# Patient Record
Sex: FEMALE | Race: BLACK OR AFRICAN AMERICAN | Employment: FULL TIME | URBAN - METROPOLITAN AREA
[De-identification: names, ages, dates, MRNs, and addresses within clinical notes are randomized per-mention and may not be internally consistent; named-entity substitution may affect disease eponyms.]

---

## 2019-12-10 ENCOUNTER — HOSPITAL ENCOUNTER (EMERGENCY)
Age: 19
Discharge: HOME | End: 2019-12-10

## 2019-12-10 VITALS
HEART RATE: 94 BPM | TEMPERATURE: 99.6 F | DIASTOLIC BLOOD PRESSURE: 72 MMHG | OXYGEN SATURATION: 100 % | BODY MASS INDEX: 39.39 KG/M2 | WEIGHT: 259.92 LBS | HEIGHT: 68 IN | SYSTOLIC BLOOD PRESSURE: 126 MMHG | RESPIRATION RATE: 18 BRPM

## 2019-12-10 DIAGNOSIS — L05.01 PILONIDAL ABSCESS: Primary | ICD-10-CM

## 2019-12-10 PROCEDURE — 6370000000 HC RX 637 (ALT 250 FOR IP): Performed by: PHYSICIAN ASSISTANT

## 2019-12-10 PROCEDURE — 99283 EMERGENCY DEPT VISIT LOW MDM: CPT

## 2019-12-10 PROCEDURE — 4500000023 HC ED LEVEL 3 PROCEDURE

## 2019-12-10 RX ORDER — CEPHALEXIN 250 MG/5ML
500 POWDER, FOR SUSPENSION ORAL 4 TIMES DAILY
Qty: 400 ML | Refills: 0 | Status: SHIPPED | OUTPATIENT
Start: 2019-12-10 | End: 2019-12-20

## 2019-12-10 RX ORDER — SULFAMETHOXAZOLE AND TRIMETHOPRIM 200; 40 MG/5ML; MG/5ML
160 SUSPENSION ORAL 2 TIMES DAILY
Qty: 400 ML | Refills: 0 | Status: SHIPPED | OUTPATIENT
Start: 2019-12-10 | End: 2019-12-20

## 2019-12-10 RX ORDER — IBUPROFEN 800 MG/1
800 TABLET ORAL EVERY 8 HOURS PRN
Qty: 20 TABLET | Refills: 0 | Status: SHIPPED | OUTPATIENT
Start: 2019-12-10

## 2019-12-10 RX ADMIN — IBUPROFEN 800 MG: 200 SUSPENSION ORAL at 18:46

## 2019-12-10 ASSESSMENT — PAIN SCALES - GENERAL
PAINLEVEL_OUTOF10: 0
PAINLEVEL_OUTOF10: 10
PAINLEVEL_OUTOF10: 0

## 2019-12-10 ASSESSMENT — ENCOUNTER SYMPTOMS
VOMITING: 0
ROS SKIN COMMENTS: +ABSCESS
NAUSEA: 0

## 2019-12-10 ASSESSMENT — PAIN DESCRIPTION - DESCRIPTORS: DESCRIPTORS: THROBBING;ACHING

## 2019-12-10 ASSESSMENT — PAIN DESCRIPTION - ORIENTATION: ORIENTATION: MID

## 2019-12-10 ASSESSMENT — PAIN DESCRIPTION - PAIN TYPE: TYPE: ACUTE PAIN

## 2019-12-10 ASSESSMENT — PAIN DESCRIPTION - LOCATION: LOCATION: RECTUM

## 2021-11-13 ENCOUNTER — HOSPITAL ENCOUNTER (EMERGENCY)
Age: 21
Discharge: LEFT BEFORE DISCHARGE | End: 2021-11-13

## 2021-11-13 VITALS
HEIGHT: 68 IN | SYSTOLIC BLOOD PRESSURE: 119 MMHG | TEMPERATURE: 98.2 F | WEIGHT: 204.59 LBS | RESPIRATION RATE: 16 BRPM | BODY MASS INDEX: 31.01 KG/M2 | HEART RATE: 75 BPM | OXYGEN SATURATION: 100 % | DIASTOLIC BLOOD PRESSURE: 79 MMHG

## 2021-11-13 ASSESSMENT — PAIN DESCRIPTION - LOCATION: LOCATION: THROAT

## 2021-11-13 ASSESSMENT — PAIN DESCRIPTION - PAIN TYPE: TYPE: ACUTE PAIN

## 2021-11-13 ASSESSMENT — PAIN SCALES - GENERAL: PAINLEVEL_OUTOF10: 10

## 2022-09-28 ENCOUNTER — APPOINTMENT (OUTPATIENT)
Dept: GENERAL RADIOLOGY | Age: 22
End: 2022-09-28

## 2022-09-28 ENCOUNTER — HOSPITAL ENCOUNTER (EMERGENCY)
Age: 22
Discharge: HOME | End: 2022-09-29
Attending: EMERGENCY MEDICINE

## 2022-09-28 VITALS
HEIGHT: 69 IN | WEIGHT: 256.62 LBS | DIASTOLIC BLOOD PRESSURE: 81 MMHG | SYSTOLIC BLOOD PRESSURE: 118 MMHG | TEMPERATURE: 97.3 F | RESPIRATION RATE: 18 BRPM | OXYGEN SATURATION: 100 % | BODY MASS INDEX: 38.01 KG/M2 | HEART RATE: 75 BPM
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
## 2022-09-28 PROCEDURE — 99283 EMERGENCY DEPT VISIT LOW MDM: CPT

## 2022-09-28 PROCEDURE — 72072 X-RAY EXAM THORAC SPINE 3VWS: CPT

## 2022-09-28 PROCEDURE — 73590 X-RAY EXAM OF LOWER LEG: CPT

## 2022-09-28 RX ORDER — ACETAMINOPHEN 325 MG/1
650 TABLET ORAL ONCE
Status: COMPLETED | OUTPATIENT
Start: 2022-09-28 | End: 2022-09-29

## 2022-09-28 RX ORDER — IBUPROFEN 400 MG/1
400 TABLET ORAL ONCE
Status: COMPLETED | OUTPATIENT
Start: 2022-09-28 | End: 2022-09-29

## 2022-09-28 ASSESSMENT — LIFESTYLE VARIABLES
HOW OFTEN DO YOU HAVE A DRINK CONTAINING ALCOHOL: NEVER
HOW MANY STANDARD DRINKS CONTAINING ALCOHOL DO YOU HAVE ON A TYPICAL DAY: PATIENT DOES NOT DRINK

## 2022-09-29 PROCEDURE — 6370000000 HC RX 637 (ALT 250 FOR IP): Performed by: EMERGENCY MEDICINE

## 2022-09-29 RX ADMIN — ACETAMINOPHEN 650 MG: 325 TABLET ORAL at 00:38

## 2022-09-29 RX ADMIN — IBUPROFEN 400 MG: 400 TABLET, FILM COATED ORAL at 00:37

## 2022-09-29 ASSESSMENT — PAIN DESCRIPTION - LOCATION: LOCATION: BACK;LEG

## 2022-09-29 ASSESSMENT — ENCOUNTER SYMPTOMS
CONSTIPATION: 0
BACK PAIN: 1
SHORTNESS OF BREATH: 0
EYE DISCHARGE: 0
ABDOMINAL PAIN: 0
COUGH: 0
EYE ITCHING: 0
VOMITING: 0
COLOR CHANGE: 0

## 2022-09-29 ASSESSMENT — PAIN - FUNCTIONAL ASSESSMENT: PAIN_FUNCTIONAL_ASSESSMENT: PREVENTS OR INTERFERES WITH MANY ACTIVE NOT PASSIVE ACTIVITIES

## 2022-09-29 ASSESSMENT — PAIN DESCRIPTION - ORIENTATION: ORIENTATION: RIGHT

## 2022-09-29 ASSESSMENT — PAIN SCALES - GENERAL: PAINLEVEL_OUTOF10: 8

## 2022-09-29 ASSESSMENT — PAIN DESCRIPTION - DESCRIPTORS: DESCRIPTORS: ACHING

## 2022-09-29 NOTE — ED NOTES
D/C: Order noted for d/c. Pt confirmed d/c paperwork  have correct name. Discharge and education instructions reviewed with patient. Teach-back successful. Pt verbalized understanding and signed d/c papers. Pt denied questions at this time. No acute distress noted. Patient instructed to follow-up as noted - return to emergency department if symptoms worsen. Patient verbalized understanding. Discharged per EDMD with discharge instructions. Pt discharged  to private vehicle. Patient stable upon departure. Thanked patient for choosing [facility]) for care.                 Semaj Schwab RN  09/29/22 9177
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
## 2023-03-02 ENCOUNTER — HOSPITAL ENCOUNTER (EMERGENCY)
Age: 23
Discharge: HOME | End: 2023-03-02
Attending: EMERGENCY MEDICINE
Payer: MEDICAID

## 2023-03-02 VITALS
RESPIRATION RATE: 16 BRPM | WEIGHT: 261.47 LBS | BODY MASS INDEX: 39.63 KG/M2 | HEART RATE: 70 BPM | HEIGHT: 68 IN | TEMPERATURE: 97.7 F | OXYGEN SATURATION: 100 % | SYSTOLIC BLOOD PRESSURE: 133 MMHG | DIASTOLIC BLOOD PRESSURE: 81 MMHG

## 2023-03-02 DIAGNOSIS — L05.01 PILONIDAL ABSCESS: Primary | ICD-10-CM

## 2023-03-02 PROCEDURE — 2500000003 HC RX 250 WO HCPCS: Performed by: EMERGENCY MEDICINE

## 2023-03-02 PROCEDURE — 10080 I&D PILONIDAL CYST SIMPLE: CPT

## 2023-03-02 PROCEDURE — 99283 EMERGENCY DEPT VISIT LOW MDM: CPT

## 2023-03-02 RX ORDER — NAPROXEN 500 MG/1
500 TABLET ORAL 2 TIMES DAILY WITH MEALS
Qty: 20 TABLET | Refills: 0 | Status: SHIPPED | OUTPATIENT
Start: 2023-03-02 | End: 2023-03-12

## 2023-03-02 RX ORDER — CLINDAMYCIN HYDROCHLORIDE 300 MG/1
300 CAPSULE ORAL EVERY 6 HOURS
Qty: 40 CAPSULE | Refills: 0 | Status: SHIPPED | OUTPATIENT
Start: 2023-03-02 | End: 2023-03-12

## 2023-03-02 RX ADMIN — LIDOCAINE HYDROCHLORIDE 10 ML: 10; .005 INJECTION, SOLUTION EPIDURAL; INFILTRATION; INTRACAUDAL; PERINEURAL at 13:23

## 2023-03-02 ASSESSMENT — PAIN - FUNCTIONAL ASSESSMENT
PAIN_FUNCTIONAL_ASSESSMENT: 0-10
PAIN_FUNCTIONAL_ASSESSMENT: 0-10

## 2023-03-02 ASSESSMENT — PAIN DESCRIPTION - LOCATION: LOCATION: OTHER (COMMENT)

## 2023-03-02 ASSESSMENT — LIFESTYLE VARIABLES: HOW OFTEN DO YOU HAVE A DRINK CONTAINING ALCOHOL: NEVER

## 2023-03-02 ASSESSMENT — PAIN DESCRIPTION - DESCRIPTORS: DESCRIPTORS: SORE

## 2023-03-02 ASSESSMENT — PAIN SCALES - GENERAL
PAINLEVEL_OUTOF10: 7
PAINLEVEL_OUTOF10: 8

## 2023-03-02 NOTE — Clinical Note
Mirtha Urena was seen and treated in our emergency department on 3/2/2023. She may return to work on 03/05/2023. If you have any questions or concerns, please don't hesitate to call.       Sj Mcelroy, DO

## 2023-03-02 NOTE — Clinical Note
Maday Faustin was seen and treated in our emergency department on 3/2/2023. She may return to work on 03/05/2023. If you have any questions or concerns, please don't hesitate to call.       Phyllis Mcelroy, DO

## 2023-03-02 NOTE — DISCHARGE INSTRUCTIONS
Please help your primary care physician within the next 2 days. The packing in your wound should be removed in 48 hours. Do not try to replace the packing if it falls out before then. Complete your entire course of antibiotics as prescribed, even if you start feeling better. May take naproxen as needed for pain, you may safely combine this medication with Tylenol. Return to the emergency room if you have worsening or changing symptoms, fevers, severe pain or increased swelling. Return if you have other new or changing symptoms that concern you and you wish to be reevaluated.
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
## 2023-03-02 NOTE — Clinical Note
Selena Edwards was seen and treated in our emergency department on 3/2/2023. She may return to work on 03/05/2023. If you have any questions or concerns, please don't hesitate to call.       Shahida Mcelroy, DO

## 2023-03-03 NOTE — ED PROVIDER NOTES
EMERGENCY DEPARTMENT PROVIDER NOTE    Patient Identification  Pt Name: Kassie Dueñas  MRN: [id number]  Birthdate 2000  Date of evaluation: 3/2/2023  Provider: Jose Mcelroy DO  PCP: No primary care provider on file.    Chief Complaint  Abscess (Pt states boil above rectum x1wk, pt reports hx of same that was drained in an ED.)      HPI  (History provided by patient)  This is a 22 y.o. female with pertinent past medical history of pilonidal abscesses who was brought in by self for abscess to her right inner buttock.  Symptoms gradually worsening over the past 7 days.  Denies any fevers, chills, rectal pain or bleeding.       I have reviewed the following nursing documentation:  Allergies: Patient has no known allergies.    Past medical history: History reviewed. No pertinent past medical history.  Past surgical history: History reviewed. No pertinent surgical history.    Home medications:   Discharge Medication List as of 3/2/2023  1:43 PM        CONTINUE these medications which have NOT CHANGED    Details   ibuprofen (ADVIL;MOTRIN) 800 MG tablet Take 1 tablet by mouth every 8 hours as needed for Pain May crush and put in applesauce or pudding, Disp-20 tablet, R-0Print             Social history:  reports that she has never smoked. She has never used smokeless tobacco. She reports that she does not currently use alcohol.    Family history:  History reviewed. No pertinent family history.      Exam  ED Triage Vitals [03/02/23 1212]   BP Temp Temp Source Heart Rate Resp SpO2 Height Weight   133/81 97.7 °F (36.5 °C) Oral 70 16 100 % 5' 8\" (1.727 m) 261 lb 7.5 oz (118.6 kg)     Nursing note and vitals reviewed.  Constitutional: Well developed, well nourished. Non-toxic in appearance.  BMI 39.76.  Cardiovascular: RRR; no murmurs, rubs, or gallops.  Pulmonary/Chest: Effort normal. No respiratory distress. CTAB. No stridor. No wheezes. No rales.   Abdominal: Soft. No distension.  Nontender to deep palpation all  quadrants. Rectal: 3 x 2 cm abscess along right medial gluteal cleft with 1 cm margin of surrounding cellulitis. No perirectal involvement. Musculoskeletal: Moves all extremities. No gross deformity. Neurological: Alert and orientedx4. Face symmetric. Speech is clear. Skin: Warm and dry. No rash. Psychiatric: Normal mood and affect. Behavior is normal.    Procedures      Incision and Drainage    Date: 3/2/23  Indication: Abscess  Location: Right gluteal cleft  Consent: Verbal   Complexity: Complex    A time out was performed verifying correct patient, positioning and equipment. Skin overlying abscess was cleaned with betadine prep. 3 mL of 1% lidocaine with epinephrine was infiltrated into the skin overlying the abscess. Patient was checked to ensure adequate local analgesia. An 11 blade scalpel was used to make an incision over the abscess after which copious amount of purulence was expressed. Wound was probed and loculations were decompressed. Iodoform packing was placed. Site was inspected and bleeding was controlled. Perfusion and sensation distal to incision was checked and found to be adequate. Patient tolerated the procedure well and there were no complications. Radiology  No orders to display       Any applicable radiology studies including x-ray, CT, MRI, and/or ultrasound, were reviewed independently by me in addition to the radiologist.  I reviewed all radiology images and reports as well from this evaluation. Labs  No results found for this visit on 03/02/23. MDM and ED Course    Patient afebrile and nontoxic. No distress. Overall well-appearing without systemic signs of illness. Gluteal abscess localized on exam, no evidence of perirectal or anal involvement. Abscess was incised and drained without complication. There is mild overlying cellulitis, however my suspicion for deep space infection or Dread's is extremely low and not consistent with exam findings.   Patient felt safe for outpatient management with antibiotics and close PCP follow-up. We will also refer to general surgery given potential recurrent pilonidal abscesses. Patient is agreeable with plan and feels comfortable returning to home. Return precautions and wound care instructions were discussed. During the patient's ED course, the patient was given:  Medications   lidocaine-EPINEPHrine 1 percent-1:502105 injection 10 mL (10 mLs IntraDERmal Given by Other 3/2/23 1323)        Consults:  None        History obtained from: Patient    Pertinent social determinants of health: Does not have a PCP    Chronic conditions potentially affecting care: None applicable    Review of other records:  None    Reassessment:  See MDM for details of medications given and reassessment      I Dr. Shanel Albarran am the primary clinician of record. Final Impression  1. Pilonidal abscess        Blood pressure 133/81, pulse 70, temperature 97.7 °F (36.5 °C), temperature source Oral, resp. rate 16, height 5' 8\" (1.727 m), weight 261 lb 7.5 oz (118.6 kg), SpO2 100 %. Disposition:  DISPOSITION Decision To Discharge 03/02/2023 01:20:32 PM      Patient Referrals:  [facility]  [phone]        Jennifer Ragsdale MD  [facility] [address] [place] [address]  [phone]    In 1 week        Discharge Medications:  Discharge Medication List as of 3/2/2023  1:43 PM        START taking these medications    Details   clindamycin (CLEOCIN) 300 MG capsule Take 1 capsule by mouth in the morning and 1 capsule at noon and 1 capsule in the evening and 1 capsule before bedtime. Do all this for 10 days. , Disp-40 capsule, R-0Print      naproxen (NAPROSYN) 500 MG tablet Take 1 tablet by mouth 2 times daily (with meals) for 10 days, Disp-20 tablet, R-0Print             Discontinued Medications:  Discharge Medication List as of 3/2/2023  1:43 PM          This chart was generated using the [address] [place] [address] dictation system.  I created this record but it may contain dictation errors given the limitations of this technology.     Zina Jaramillo DO (electronically signed)  Attending Emergency Physician       Zina Jaramillo DO  03/03/23 1043

## 2023-08-05 ENCOUNTER — HOSPITAL ENCOUNTER (EMERGENCY)
Age: 23
Discharge: LEFT BEFORE DISCHARGE | End: 2023-08-05

## 2023-08-05 ENCOUNTER — APPOINTMENT (OUTPATIENT)
Dept: GENERAL RADIOLOGY | Age: 23
End: 2023-08-05
Payer: MEDICAID

## 2023-08-05 ENCOUNTER — HOSPITAL ENCOUNTER (EMERGENCY)
Age: 23
Discharge: HOME | End: 2023-08-05
Attending: EMERGENCY MEDICINE
Payer: MEDICAID

## 2023-08-05 VITALS
DIASTOLIC BLOOD PRESSURE: 69 MMHG | OXYGEN SATURATION: 99 % | BODY MASS INDEX: 41.4 KG/M2 | WEIGHT: 279.54 LBS | SYSTOLIC BLOOD PRESSURE: 121 MMHG | HEIGHT: 69 IN | HEART RATE: 73 BPM | TEMPERATURE: 98.8 F | RESPIRATION RATE: 18 BRPM

## 2023-08-05 DIAGNOSIS — S86.911A MUSCLE STRAIN OF RIGHT LOWER EXTREMITY, INITIAL ENCOUNTER: Primary | ICD-10-CM

## 2023-08-05 LAB — D DIMER: <0.27 UG/ML FEU (ref 0–0.6)

## 2023-08-05 PROCEDURE — 36415 COLL VENOUS BLD VENIPUNCTURE: CPT

## 2023-08-05 PROCEDURE — 73560 X-RAY EXAM OF KNEE 1 OR 2: CPT

## 2023-08-05 PROCEDURE — 99284 EMERGENCY DEPT VISIT MOD MDM: CPT

## 2023-08-05 PROCEDURE — 85379 FIBRIN DEGRADATION QUANT: CPT

## 2023-08-05 RX ORDER — CYCLOBENZAPRINE HCL 10 MG
10 TABLET ORAL 3 TIMES DAILY PRN
Qty: 21 TABLET | Refills: 0 | Status: SHIPPED | OUTPATIENT
Start: 2023-08-05 | End: 2023-08-15

## 2023-08-05 ASSESSMENT — PAIN DESCRIPTION - LOCATION: LOCATION: LEG

## 2023-08-05 ASSESSMENT — PAIN DESCRIPTION - DESCRIPTORS: DESCRIPTORS: SPASM

## 2023-08-05 ASSESSMENT — PAIN DESCRIPTION - ORIENTATION: ORIENTATION: RIGHT

## 2023-08-05 ASSESSMENT — PAIN DESCRIPTION - PAIN TYPE: TYPE: ACUTE PAIN

## 2023-08-05 ASSESSMENT — PAIN SCALES - GENERAL
PAINLEVEL_OUTOF10: 8
PAINLEVEL_OUTOF10: 8

## 2023-08-05 NOTE — ED PROVIDER NOTES
[address] ENCOUNTER      Pt Name: Ziyad Garcia  MRN: [id number]  [address] [facility] [place] 2000  Date of evaluation: 8/5/2023  Provider: Radha Melchor  Chief Complaint   Patient presents with    Leg Pain     Pt states tripped and accidentally fell 2 weeks ago. Has had pain behind RLE (mostly just below knee). For past 2-3 days, pain now shooting from behind left lower knee down to left ankle. Pain now at 8. No OTC pain meds taken. Palpable bilat dors pedis/post tib pulses. Bilat lower legs warm with full ROM and natural skin color. Walked back to room without a limp. This patient is at risk for a communicable infection. Therefore, personal protection equipment consisting of a mask was worn for the exam.    HPI  Ziyad Garcia is a 20 y.o. female who presents with right posterior knee pain that started 2 weeks ago when she twisted her knee. She states pain is gotten worse. Is now rating down to her ankle. She denies any new injuries since 2 weeks ago. She states for the past 2 to 3 days its been shooting from her knee to her ankle. She denies any previous history of blood clots. Ambulation makes it worse and rest makes it better. She describes the pain as sharp and severe. Her visitor states that she has varicose veins and they bulging out a couple days ago without gone now. REVIEW OF SYSTEMS  All systems negative except as noted in the HPI. Reviewed Nurses' notes and concur. Patient's last menstrual period was 07/30/2023. PAST MEDICAL HISTORY  History reviewed. No pertinent past medical history. FAMILY HISTORY  History reviewed. No pertinent family history. SOCIAL HISTORY   reports that she has never smoked. She has never used smokeless tobacco. She reports that she does not currently use alcohol. SURGICAL HISTORY  History reviewed. No pertinent surgical history.     CURRENT MEDICATIONS      ALLERGIES  No

## 2023-08-05 NOTE — ED NOTES
Pt states tripped and accidentally fell 2 weeks ago. Has had pain behind RLE (mostly just below knee). For past 2-3 days, pain now shooting from behind left lower knee down to left ankle. Pain now at 8. No OTC pain meds taken. Palpable bilat dors pedis/post tib pulses. Bilat lower legs warm with full ROM and natural skin color. Walked back to room without a limp.      Pedro Luis Ga RN  08/05/23 3681

## 2023-08-05 NOTE — ED NOTES
[id number] explained new orders. Fitted for walking boot right foot with teaching for home use. Declines crutches saying she has some at home. Discharge instructions with pt. Explained rx's. Encouraged follow up with orthopedic specialist as referred and PCP. Encouraged to return to ED as needed. Walked out of ED without any difficulty. No limp. Pain at 6.        Genoveva Waldron RN  08/05/23 2357

## 2023-08-05 NOTE — ED NOTES
Now agreeable to d dimer being drawn. Lab drawn easily right antecubital.  Puma well. Family remains at bedside.      Wilson Ojeda RN  08/05/23 0431

## 2023-08-05 NOTE — ED NOTES
Pt left lobby  did not say why  She did ask if boss called could we talk to him  told not without her permission  Not angry or upset     Nahed Parsons RN  08/05/23 1483

## 2024-02-04 ENCOUNTER — HOSPITAL ENCOUNTER (EMERGENCY)
Age: 24
Discharge: HOME | End: 2024-02-04

## 2024-02-04 ENCOUNTER — APPOINTMENT (OUTPATIENT)
Dept: CT IMAGING | Age: 24
End: 2024-02-04

## 2024-02-04 VITALS
HEIGHT: 69 IN | WEIGHT: 286 LBS | BODY MASS INDEX: 42.36 KG/M2 | TEMPERATURE: 98.2 F | SYSTOLIC BLOOD PRESSURE: 128 MMHG | DIASTOLIC BLOOD PRESSURE: 93 MMHG | RESPIRATION RATE: 16 BRPM | HEART RATE: 84 BPM | OXYGEN SATURATION: 100 %

## 2024-02-04 DIAGNOSIS — R11.2 NAUSEA AND VOMITING, UNSPECIFIED VOMITING TYPE: ICD-10-CM

## 2024-02-04 DIAGNOSIS — R10.13 ABDOMINAL PAIN, EPIGASTRIC: Primary | ICD-10-CM

## 2024-02-04 PROCEDURE — 6370000000 HC RX 637 (ALT 250 FOR IP): Performed by: PHYSICIAN ASSISTANT

## 2024-02-04 PROCEDURE — 99283 EMERGENCY DEPT VISIT LOW MDM: CPT

## 2024-02-04 RX ORDER — ONDANSETRON 4 MG/1
4 TABLET, ORALLY DISINTEGRATING ORAL 3 TIMES DAILY PRN
Qty: 21 TABLET | Refills: 0 | Status: SHIPPED | OUTPATIENT
Start: 2024-02-04

## 2024-02-04 RX ORDER — OMEPRAZOLE 40 MG/1
40 CAPSULE, DELAYED RELEASE ORAL
Qty: 30 CAPSULE | Refills: 2 | Status: SHIPPED | OUTPATIENT
Start: 2024-02-04

## 2024-02-04 RX ADMIN — Medication: at 16:15

## 2024-02-04 ASSESSMENT — PAIN - FUNCTIONAL ASSESSMENT
PAIN_FUNCTIONAL_ASSESSMENT: 0-10
PAIN_FUNCTIONAL_ASSESSMENT: ACTIVITIES ARE NOT PREVENTED
PAIN_FUNCTIONAL_ASSESSMENT: 0-10
PAIN_FUNCTIONAL_ASSESSMENT: 0-10
PAIN_FUNCTIONAL_ASSESSMENT: ACTIVITIES ARE NOT PREVENTED

## 2024-02-04 ASSESSMENT — PAIN SCALES - GENERAL
PAINLEVEL_OUTOF10: 10
PAINLEVEL_OUTOF10: 2

## 2024-02-04 ASSESSMENT — PAIN DESCRIPTION - DESCRIPTORS
DESCRIPTORS: DISCOMFORT
DESCRIPTORS: DISCOMFORT

## 2024-02-04 ASSESSMENT — PAIN DESCRIPTION - LOCATION
LOCATION: ABDOMEN
LOCATION: ABDOMEN

## 2024-02-04 ASSESSMENT — PAIN DESCRIPTION - ORIENTATION
ORIENTATION: MID;UPPER
ORIENTATION: MID

## 2024-02-04 ASSESSMENT — PAIN DESCRIPTION - PAIN TYPE
TYPE: ACUTE PAIN
TYPE: ACUTE PAIN

## 2024-02-04 NOTE — ED PROVIDER NOTES
**ADVANCED PRACTICE PROVIDER, I HAVE EVALUATED THIS PATIENT**        [facility]  EMERGENCY DEPARTMENT ENCOUNTER      Pt Name: Kassie Dueñas  MRN:[id number]  Birthdate 2000  Date of evaluation: 2/4/2024  Provider: Billy Pena PA-C  Note Started: 4:53 PM EST 2/4/24        Chief Complaint:    Chief Complaint   Patient presents with    Emesis         Nursing Notes, Past Medical Hx, Past Surgical Hx, Social Hx, Allergies, and Family Hx were all reviewed and agreed with or any disagreements were addressed in the HPI.    HPI: (Location, Duration, Timing, Severity, Quality, Assoc Sx, Context, Modifying factors)    History From: Patient  Limitations to history : None    Social Determinants Significantly Affecting Health : None    Chief Complaint of epigastric abdominal pain nausea and vomiting.  Also had a bout of diarrhea.  Denies blood in her stool.  Denies coughing up blood.  She states she woke up around 2:00 this morning after eating and she threw up all her food.  And she has some diarrhea.  Says she must of threw up about 3-4 times since 2:00 AM and 12 noon.  She states she still feels nauseated but no vomiting episodes as well.  Pain in her stomach does not radiate to her back.  No fevers, no past history of pancreatitis or gastritis.  She described the pain as sharp.  She has not taken anything for the pain at this time.  No other complaints.    This is a  23 y.o. female who presents to the emergency room with the above complaint.    PastMedical/Surgical History:  History reviewed. No pertinent past medical history.  History reviewed. No pertinent surgical history.    Medications:  Previous Medications    No medications on file       Review of Systems:  (1 systems needed)  Review of Systems   Constitutional:  Negative for chills and fever.   HENT:  Negative for congestion and sore throat.    Eyes:  Negative for pain and visual disturbance.   Respiratory:  Negative for cough and

## 2024-02-04 NOTE — DISCHARGE INSTRUCTIONS
Translators     Health Care [address] [place] [address] [phone]  (Located: [facility])  Pediatrics [phone], [place] [phone],   Dental Appointments [phone] or [phone]  [facility] [facility] [phone] [facility]  [address] [address]  [phone]  Medical, OB/Gyn, Pediatrics, [place] [facility]  Dental Clinic [phone]       [facility]  [address] [place]  [address]  [phone] Fax [phone]  Pediatrics, [facility]  [address] [address]  [phone]   Fax [phone]  Pediatrics, [facility]  [address] [address]  [phone]  Medical Clinic   [facility]  [address] [address]  [phone]  Pediatrics, Internal Med, OB/Gyn, [place] [facility], Dental Clinic [phone]     [facility]  [address] [facility] [address]   [phone] [address] [address]  [phone] Fax [phone]  [facility]  Sliding scale fee  [facility]  [address] [place], [place] [address]  [phone]     [facility]  [address] [place], [place] [address]  [phone]    [facility]   [address], [place] [id number] [phone]         [facility]  [address] [place], [place] [address]  [facility]  [phone]  Fax:  [phone]  [facility]  [phone]  Fax:  [phone]  General Medical Clinic  Resident Practice  [phone]  Fax:  [phone]  [facility]  [phone]  Fax:  [phone]  Orthopaedics  [phone]     [facility] ([facility] [place])  [address] [place] [address] [phone]    PILI ([place]umberto)    [facility]

## 2025-03-09 ENCOUNTER — OFFICE VISIT (OUTPATIENT)
Age: 25
End: 2025-03-09

## 2025-03-09 VITALS
BODY MASS INDEX: 42.97 KG/M2 | RESPIRATION RATE: 18 BRPM | SYSTOLIC BLOOD PRESSURE: 133 MMHG | WEIGHT: 291 LBS | HEART RATE: 89 BPM | TEMPERATURE: 98.2 F | DIASTOLIC BLOOD PRESSURE: 85 MMHG | OXYGEN SATURATION: 98 %

## 2025-03-09 DIAGNOSIS — L02.31 CELLULITIS AND ABSCESS OF BUTTOCK: Primary | ICD-10-CM

## 2025-03-09 DIAGNOSIS — L03.317 CELLULITIS AND ABSCESS OF BUTTOCK: Primary | ICD-10-CM

## 2025-03-09 RX ORDER — CEPHALEXIN 500 MG/1
500 CAPSULE ORAL 3 TIMES DAILY
Qty: 30 CAPSULE | Refills: 0 | Status: SHIPPED | OUTPATIENT
Start: 2025-03-09 | End: 2025-03-19